# Patient Record
Sex: MALE | Race: WHITE
[De-identification: names, ages, dates, MRNs, and addresses within clinical notes are randomized per-mention and may not be internally consistent; named-entity substitution may affect disease eponyms.]

---

## 2017-12-28 ENCOUNTER — HOSPITAL ENCOUNTER (INPATIENT)
Dept: HOSPITAL 92 - ERS | Age: 27
LOS: 2 days | Discharge: HOME | DRG: 603 | End: 2017-12-30
Attending: SPECIALIST | Admitting: SPECIALIST
Payer: COMMERCIAL

## 2017-12-28 VITALS — BODY MASS INDEX: 21.2 KG/M2

## 2017-12-28 DIAGNOSIS — F17.210: ICD-10-CM

## 2017-12-28 DIAGNOSIS — F11.20: ICD-10-CM

## 2017-12-28 DIAGNOSIS — L02.31: Primary | ICD-10-CM

## 2017-12-28 DIAGNOSIS — L02.413: ICD-10-CM

## 2017-12-28 LAB
ALBUMIN SERPL BCG-MCNC: 3 G/DL (ref 3.5–5)
ALP SERPL-CCNC: 100 U/L (ref 40–150)
ALT SERPL W P-5'-P-CCNC: 24 U/L (ref 8–55)
ANION GAP SERPL CALC-SCNC: 12 MMOL/L (ref 10–20)
AST SERPL-CCNC: 32 U/L (ref 5–34)
BILIRUB SERPL-MCNC: 0.2 MG/DL (ref 0.2–1.2)
BUN SERPL-MCNC: 8 MG/DL (ref 8.9–20.6)
CALCIUM SERPL-MCNC: 8.4 MG/DL (ref 7.8–10.44)
CHLORIDE SERPL-SCNC: 100 MMOL/L (ref 98–107)
CK SERPL-CCNC: 33 U/L (ref 30–200)
CO2 SERPL-SCNC: 27 MMOL/L (ref 22–29)
CREAT CL PREDICTED SERPL C-G-VRATE: 0 ML/MIN (ref 70–130)
CRP SERPL-MCNC: 14.85 MG/DL
DRUG SCREEN CUTOFF: (no result)
GLOBULIN SER CALC-MCNC: 3.3 G/DL (ref 2.4–3.5)
GLUCOSE SERPL-MCNC: 111 MG/DL (ref 70–105)
HGB BLD-MCNC: 14.2 G/DL (ref 14–18)
MCH RBC QN AUTO: 27.4 PG (ref 27–31)
MCV RBC AUTO: 86.4 FL (ref 80–94)
MDIFF COMPLETE?: YES
MEDTOX CONTROL LINE VALID?: (no result)
MEDTOX READER #: (no result)
PLATELET # BLD AUTO: 499 THOU/UL (ref 130–400)
PLATELET BLD QL SMEAR: (no result)
POTASSIUM SERPL-SCNC: 3.3 MMOL/L (ref 3.5–5.1)
RBC # BLD AUTO: 5.2 MILL/UL (ref 4.7–6.1)
SODIUM SERPL-SCNC: 136 MMOL/L (ref 136–145)
SP GR UR STRIP: 1.02 (ref 1–1.04)
TOXIC GRANULES BLD QL SMEAR: SLIGHT
WBC # BLD AUTO: 22.7 THOU/UL (ref 4.8–10.8)

## 2017-12-28 PROCEDURE — 85025 COMPLETE CBC W/AUTO DIFF WBC: CPT

## 2017-12-28 PROCEDURE — 87040 BLOOD CULTURE FOR BACTERIA: CPT

## 2017-12-28 PROCEDURE — 80306 DRUG TEST PRSMV INSTRMNT: CPT

## 2017-12-28 PROCEDURE — 80048 BASIC METABOLIC PNL TOTAL CA: CPT

## 2017-12-28 PROCEDURE — 72193 CT PELVIS W/DYE: CPT

## 2017-12-28 PROCEDURE — 02HV33Z INSERTION OF INFUSION DEVICE INTO SUPERIOR VENA CAVA, PERCUTANEOUS APPROACH: ICD-10-PCS | Performed by: EMERGENCY MEDICINE

## 2017-12-28 PROCEDURE — 83605 ASSAY OF LACTIC ACID: CPT

## 2017-12-28 PROCEDURE — 87077 CULTURE AEROBIC IDENTIFY: CPT

## 2017-12-28 PROCEDURE — 86140 C-REACTIVE PROTEIN: CPT

## 2017-12-28 PROCEDURE — 80053 COMPREHEN METABOLIC PANEL: CPT

## 2017-12-28 PROCEDURE — 71010: CPT

## 2017-12-28 PROCEDURE — 87086 URINE CULTURE/COLONY COUNT: CPT

## 2017-12-28 PROCEDURE — 81003 URINALYSIS AUTO W/O SCOPE: CPT

## 2017-12-28 PROCEDURE — 82550 ASSAY OF CK (CPK): CPT

## 2017-12-28 PROCEDURE — 87205 SMEAR GRAM STAIN: CPT

## 2017-12-28 PROCEDURE — 87070 CULTURE OTHR SPECIMN AEROBIC: CPT

## 2017-12-28 NOTE — RAD
PORTABLE CHEST:

12/28/17

 

HISTORY: 

Central line placement.

 

COMPARISON:  

A 6/20/17 exam.

 

Right sided central line has been placed. Catheter tip overlies the region of the proximal to mid sup
erior vena cava. I do not see any signs of pneumothorax. 

 

IMPRESSION:  

Placement of right sided central line. No signs of pneumothorax. 

 

POS: [facility]

## 2017-12-28 NOTE — CT
CT OF PELVIS PERFORMED WITH INTRAVENOUS CONTRAST ENHANCEMENT:

12/28/17

 

HISTORY: 

Patient reported falling on right side week and a half ago. Now with fever. 

 

The intrapelvic contents are unremarkable. No significant adenopathy. No fluid collections. 

 

Osseous structures are normal. I see no evidence for osteomyelitis. 

 

There is a large superficial fluid collection in the right buttocks region. It is compressing the glu
teus madalyn muscle. This measures 6.5 x 14 x 13 cm in size. There is no air seen associated with thi
s. This could represent an infected collection. It could represent a hematoma given the history of a 
fall. No other findings.

 

IMPRESSION:  

Large superficial fluid collection compressing the right gluteus madalyn musculature without deep ext
ension. This could represent a hematoma. I cannot exclude this is an infected fluid collection. 

 

POS: [facility]

## 2017-12-29 PROCEDURE — 0H9BXZZ DRAINAGE OF RIGHT UPPER ARM SKIN, EXTERNAL APPROACH: ICD-10-PCS | Performed by: SPECIALIST

## 2017-12-29 PROCEDURE — 0H98XZZ DRAINAGE OF BUTTOCK SKIN, EXTERNAL APPROACH: ICD-10-PCS | Performed by: SPECIALIST

## 2017-12-29 RX ADMIN — Medication SCH: at 20:09

## 2017-12-29 RX ADMIN — CLINDAMYCIN PHOSPHATE SCH: 600 INJECTION, SOLUTION INTRAVENOUS at 12:00

## 2017-12-29 RX ADMIN — CLINDAMYCIN PHOSPHATE SCH MLS: 600 INJECTION, SOLUTION INTRAVENOUS at 18:19

## 2017-12-29 RX ADMIN — CLINDAMYCIN PHOSPHATE SCH MLS: 600 INJECTION, SOLUTION INTRAVENOUS at 05:04

## 2017-12-29 RX ADMIN — Medication SCH: at 09:00

## 2017-12-29 RX ADMIN — CLINDAMYCIN PHOSPHATE SCH MLS: 600 INJECTION, SOLUTION INTRAVENOUS at 23:34

## 2017-12-29 NOTE — HP
DATE:  12/29/2017

 

CHIEF COMPLAINT:  Right buttock abscess.

 

HISTORY OF PRESENT ILLNESS:  Patient is a 27-year-old white male.  He has a longstanding history of h
eroin addiction.  He notes that he has had progressive pain in his right buttock for about a week and
 a half.  He states that he fell on this side a week and half ago or so.  He also is concerned about 
possible spider bite.  It is noted that he has had large deep abscesses drained on both of his upper 
extremities within the past year.  He does continue to inject IV drugs.  [facility] was febrile and tachycardic.  He was started on IV fluids and IV antibiotics and give
n pain medication.  His tachycardia resolved overnight, blood pressures remained stable.  He was afeb
rile at 4:00 this morning up to a temperature of 101.1.

 

PAST MEDICAL HISTORY:  Significant for prior deep abscesses in both upper extremities.  He also has a
n ongoing history of IV drug abuse using heroin.

 

PAST SURGICAL HISTORY:  I and D and in 2016 of right upper extremity abscess.  Incision and drainage 
of left upper extremity abscess in 06/2017.

 

MEDICATIONS:  At home, none.

 

ALLERGIES:  SULFA MEDICATIONS.

 

PERSONAL AND SOCIAL HISTORY:  He is single, but has a girlfriend.  He lives in [place].  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
[employer] odd jobs.  He smokes about a half-pack per day of cigarettes and denies alcohol use.

 

REVIEW OF SYSTEMS:  Otherwise, unremarkable.

 

FAMILY HISTORY:  Noncontributory.

 

PHYSICAL EXAMINATION:

VITAL SIGNS:  Maximum temperature is 101.1, current temperature 98.6, pulse 92, blood pressure 118/74
.

GENERAL:  A well-developed, well-nourished, pleasant white male, resting in bed.  He is alert and saturnino
ented x3.  He has obvious pain in his right buttock.

HEAD, EYES, EARS, NOSE, AND THROAT:  Unremarkable.

NECK:  Supple.

LUNGS:  Clear to auscultation.

CARDIAC:  Regular rate and rhythm.

ABDOMEN:  Soft, nontender, nondistended.

EXTREMITIES:  He has a large fluctuant painful mass on his right buttock that appears to measure abou
t 15 cm in maximum dimension.

 

ASSESSMENT:  Patient with a large right buttock abscess.  A CT scan was obtained in the emergency anna
 which confirmed this.  The measurements on CT scan were 14 x 13 x 6.5 cm.  Laboratory studies revea
led elevated white blood cell count of 22.7.

 

PLAN:  Incision and drainage.  I have discussed this with the patient.  Further care will depend upon
 intraoperative findings.  He will certainly require long-term wound care, likely with packing with be arguello.

## 2017-12-30 VITALS — SYSTOLIC BLOOD PRESSURE: 111 MMHG | TEMPERATURE: 97.7 F | DIASTOLIC BLOOD PRESSURE: 67 MMHG

## 2017-12-30 LAB
ANION GAP SERPL CALC-SCNC: 14 MMOL/L (ref 10–20)
BASOPHILS # BLD AUTO: 0 THOU/UL (ref 0–0.2)
BASOPHILS NFR BLD AUTO: 0 % (ref 0–1)
BUN SERPL-MCNC: 6 MG/DL (ref 8.9–20.6)
CALCIUM SERPL-MCNC: 8.8 MG/DL (ref 7.8–10.44)
CHLORIDE SERPL-SCNC: 108 MMOL/L (ref 98–107)
CO2 SERPL-SCNC: 24 MMOL/L (ref 22–29)
CREAT CL PREDICTED SERPL C-G-VRATE: 174 ML/MIN (ref 70–130)
EOSINOPHIL # BLD AUTO: 0 THOU/UL (ref 0–0.7)
EOSINOPHIL NFR BLD AUTO: 0.1 % (ref 0–10)
GLUCOSE SERPL-MCNC: 152 MG/DL (ref 70–105)
HGB BLD-MCNC: 9.6 G/DL (ref 14–18)
LYMPHOCYTES # BLD: 1.7 THOU/UL (ref 1.2–3.4)
LYMPHOCYTES NFR BLD AUTO: 8.1 % (ref 21–51)
MCH RBC QN AUTO: 27.4 PG (ref 27–31)
MCV RBC AUTO: 87.2 FL (ref 80–94)
MONOCYTES # BLD AUTO: 1.2 THOU/UL (ref 0.11–0.59)
MONOCYTES NFR BLD AUTO: 5.5 % (ref 0–10)
NEUTROPHILS # BLD AUTO: 18.3 THOU/UL (ref 1.4–6.5)
NEUTROPHILS NFR BLD AUTO: 86.3 % (ref 42–75)
PLATELET # BLD AUTO: 484 THOU/UL (ref 130–400)
POTASSIUM SERPL-SCNC: 3.7 MMOL/L (ref 3.5–5.1)
RBC # BLD AUTO: 3.51 MILL/UL (ref 4.7–6.1)
SODIUM SERPL-SCNC: 142 MMOL/L (ref 136–145)
WBC # BLD AUTO: 21.3 THOU/UL (ref 4.8–10.8)

## 2017-12-30 RX ADMIN — CLINDAMYCIN PHOSPHATE SCH MLS: 600 INJECTION, SOLUTION INTRAVENOUS at 05:24

## 2017-12-30 NOTE — OP
DATE OF OPERATION:  12/29/2017

 

PREOPERATIVE DIAGNOSIS:  Large right buttock abscess.

 

POSTOPERATIVE DIAGNOSIS:  Large right buttock abscess with right upper arm abscess also.

 

OPERATION PERFORMED:  Extensive incision and drainage of a 15 cm right buttock abscess, incision and 
drainage of right upper arm abscess measuring about 3.5 cm.

 

SURGEON:  Jeremie Oropeza M.D.

 

ANESTHESIA:  General endotracheal.

 

INDICATIONS:  The patient is a 27-year-old white male with a history of ongoing heroin addiction.  He
 has had extensive abscesses of both of his upper extremities for which he has undergone operative dr melo with prolonged healing of these areas.  He now presents to the hospital with a 15 cm bulging p
ainful right buttock abscess.  He acknowledges that this has been progressive over the past week and 
a half and cannot offer an explanation as to why he did not present for treatment sooner.  He is febr
ile and tachycardic, at that time he presented.  He was hydrated with IV fluids and given IV antibiot
ics and is now taken to the operating room for incision and drainage of this large right buttock absc
ess.

 

OPERATIVE PROCEDURE IN DETAIL:  Informed consent was obtained.  The patient was taken to the operatin
g room where general endotracheal anesthesia was obtained with the patient in supine position.  He wa
s then rolled over in the left lateral decubitus position exposing his right buttock.  This was prepp
ed with ChloraPrep and draped in sterile fashion.  The entire buttock is swollen, inflamed, and fluct
uant.  I selected an area of what appeared to be relatively thin tissue overlying the abscess and cre
ated a vertical incision.  A copious volume of grayish foul smelling purulent fluid was drained.  Mos
t of this was suctioned into the suction canister.  Cultures were obtained.  When the vast majority o
f the purulent material had been aspirated, I inserted my finger into the abscess cavity and discerne
d the borders of this and created a vertical incision measuring about 10 cm in length.  The abscess c
avity was then irrigated with peroxide.  I measured the internal dimensions and appeared to be about 
15 cm vertically by about 13 cm transversely and according to his CT scan, it was at least 6 cm in th
ickness.  After irrigating with peroxide, I obtained hemostasis in the cutaneous aspect with electroc
autery.  The entire inside of the abscess cavity was drawn and there was oozing from multiple differe
nt tiny points.  I thought this was best controlled with tamponade.  I obtained 2 peroxide moistened 
Kerlix rolls and packed them into the abscess cavity.  Dry gauze dressing was placed externally.

 

The nursing staff had informed me after the patient had gone to sleep that he had an evolving similar
 infection on the lateral aspect of his right upper arm.  This was addressed at this point.  There wa
s a fluctuant mass with mild overlying erythema of the right upper arm measuring about 3.5 cm vertica
lly.  This had an oval shape.  I cleansed the skin and aspirated it, returning brownish purulent appe
aring fluid.  I therefore decided to proceed with incision and drainage of this as well.  A vertical 
incision was created over this.  Hemostasis again obtained with electrocautery.  Cultures were obtain
ed of this and sent separately.  The wound was irrigated with peroxide and packed with a single perox
valentina moistened 4 x4.  Dry gauze dressing was placed externally.  There were no complications.  The pat
ient tolerated the procedure well and was taken to recovery room in stable condition.

## 2018-01-02 NOTE — DIS
ADMISSION DIAGNOSIS:  Large right buttock abscess.

 

DISCHARGE DIAGNOSES:  Large right buttock abscess with a smaller right upper arm abscess.

 

PROCEDURES PERFORMED:  Incision and drainage of large right buttock abscess and a smaller right upper
 arm abscess.

 

ADMISSION HISTORY:  The patient is a 27-year-old white male with a long history of IV drug abuse.  He
 had been admitted twice within the past year for incision and drainage of other her abscesses.  He p
resented to the hospital on the evening of 12/28/2017 with complaint of pain associated with a large 
right buttock abscess.  He in some fashion attributed this to falling in a barn a week and a half ear
lier.  He was tachycardic and febrile.  He was given IV fluids and antibiotics and admitted to my ser
vice.

 

HOSPITAL COURSE:  The following day he was taken to the operating room where under general anesthesia
 performed incision and drainage of a huge right buttock abscess.  This was about 15 cm in vertical l
ength and 13 cm transversely.  It was filled with copious amount of foul-smelling purulent material. 
 Cultures revealed Streptococcus intermedius.  His fever abated after surgery as did his tachycardia.
  He was packed with a couple of Kerlix rolls in his buttock and gauze in his upper arm.  The day aft
er his surgery, he notified nursing staff that he wanted to leave the hospital against medical advice
.  In my absence he was discharged home by Dr. Blank.  The dressing was apparently changed prior to d
ischarge.  I assumed that he was instructed in wound care.  I am uncertain what antibiotics he was di
scharged home on.  Hopefully, he was instructed to return for followup with myself in 1-2 weeks so I 
can follow the completion of the healing of his large abscess cavity.

## 2018-03-01 ENCOUNTER — HOSPITAL ENCOUNTER (EMERGENCY)
Dept: HOSPITAL 92 - ERS | Age: 28
Discharge: HOME | End: 2018-03-01
Payer: SELF-PAY

## 2018-03-01 DIAGNOSIS — L02.416: Primary | ICD-10-CM

## 2018-03-01 DIAGNOSIS — L03.116: ICD-10-CM

## 2018-03-01 DIAGNOSIS — F17.210: ICD-10-CM

## 2018-03-01 LAB
ALBUMIN SERPL BCG-MCNC: 4.1 G/DL (ref 3.5–5)
ALP SERPL-CCNC: 105 U/L (ref 40–150)
ALT SERPL W P-5'-P-CCNC: 51 U/L (ref 8–55)
ANION GAP SERPL CALC-SCNC: 14 MMOL/L (ref 10–20)
AST SERPL-CCNC: 48 U/L (ref 5–34)
BASOPHILS # BLD AUTO: 0.1 THOU/UL (ref 0–0.2)
BASOPHILS NFR BLD AUTO: 0.4 % (ref 0–1)
BILIRUB SERPL-MCNC: 0.3 MG/DL (ref 0.2–1.2)
BUN SERPL-MCNC: 9 MG/DL (ref 8.9–20.6)
CALCIUM SERPL-MCNC: 9.6 MG/DL (ref 7.8–10.44)
CHLORIDE SERPL-SCNC: 102 MMOL/L (ref 98–107)
CO2 SERPL-SCNC: 24 MMOL/L (ref 22–29)
CREAT CL PREDICTED SERPL C-G-VRATE: 0 ML/MIN (ref 70–130)
EOSINOPHIL # BLD AUTO: 0 THOU/UL (ref 0–0.7)
EOSINOPHIL NFR BLD AUTO: 0.3 % (ref 0–10)
GLOBULIN SER CALC-MCNC: 4.1 G/DL (ref 2.4–3.5)
GLUCOSE SERPL-MCNC: 177 MG/DL (ref 70–105)
HGB BLD-MCNC: 13.1 G/DL (ref 14–18)
LYMPHOCYTES # BLD: 1.7 THOU/UL (ref 1.2–3.4)
LYMPHOCYTES NFR BLD AUTO: 9.9 % (ref 21–51)
MCH RBC QN AUTO: 27.1 PG (ref 27–31)
MCV RBC AUTO: 84.6 FL (ref 80–94)
MONOCYTES # BLD AUTO: 1.6 THOU/UL (ref 0.11–0.59)
MONOCYTES NFR BLD AUTO: 9.2 % (ref 0–10)
NEUTROPHILS # BLD AUTO: 14 THOU/UL (ref 1.4–6.5)
NEUTROPHILS NFR BLD AUTO: 80.4 % (ref 42–75)
PLATELET # BLD AUTO: 407 THOU/UL (ref 130–400)
POTASSIUM SERPL-SCNC: 3.9 MMOL/L (ref 3.5–5.1)
RBC # BLD AUTO: 4.84 MILL/UL (ref 4.7–6.1)
SODIUM SERPL-SCNC: 136 MMOL/L (ref 136–145)
WBC # BLD AUTO: 17.5 THOU/UL (ref 4.8–10.8)

## 2018-03-01 PROCEDURE — 10060 I&D ABSCESS SIMPLE/SINGLE: CPT

## 2018-03-01 PROCEDURE — 85025 COMPLETE CBC W/AUTO DIFF WBC: CPT

## 2018-03-01 PROCEDURE — 80053 COMPREHEN METABOLIC PANEL: CPT

## 2018-03-01 PROCEDURE — 99406 BEHAV CHNG SMOKING 3-10 MIN: CPT

## 2018-03-01 PROCEDURE — 87205 SMEAR GRAM STAIN: CPT

## 2018-03-01 PROCEDURE — 36415 COLL VENOUS BLD VENIPUNCTURE: CPT

## 2018-03-01 PROCEDURE — 87070 CULTURE OTHR SPECIMN AEROBIC: CPT
